# Patient Record
Sex: FEMALE | Race: WHITE | Employment: FULL TIME | URBAN - METROPOLITAN AREA
[De-identification: names, ages, dates, MRNs, and addresses within clinical notes are randomized per-mention and may not be internally consistent; named-entity substitution may affect disease eponyms.]

---

## 2021-11-09 ENCOUNTER — APPOINTMENT (OUTPATIENT)
Dept: CT IMAGING | Age: 36
End: 2021-11-09
Attending: STUDENT IN AN ORGANIZED HEALTH CARE EDUCATION/TRAINING PROGRAM
Payer: COMMERCIAL

## 2021-11-09 ENCOUNTER — HOSPITAL ENCOUNTER (OUTPATIENT)
Age: 36
Setting detail: OBSERVATION
Discharge: HOME | End: 2021-11-10
Attending: STUDENT IN AN ORGANIZED HEALTH CARE EDUCATION/TRAINING PROGRAM | Admitting: FAMILY MEDICINE
Payer: COMMERCIAL

## 2021-11-09 DIAGNOSIS — L03.221 CELLULITIS OF NECK: Primary | ICD-10-CM

## 2021-11-09 LAB
ALBUMIN SERPL-MCNC: 4.1 G/DL (ref 3.5–5)
ALBUMIN/GLOB SERPL: 1 {RATIO} (ref 1.1–2.2)
ALP SERPL-CCNC: 74 U/L (ref 45–117)
ALT SERPL-CCNC: 59 U/L (ref 12–78)
ANION GAP SERPL CALC-SCNC: 5 MMOL/L (ref 5–15)
AST SERPL-CCNC: 45 U/L (ref 15–37)
BASOPHILS # BLD: 0.1 K/UL (ref 0–0.1)
BASOPHILS NFR BLD: 1 % (ref 0–1)
BILIRUB SERPL-MCNC: 0.4 MG/DL (ref 0.2–1)
BUN SERPL-MCNC: 14 MG/DL (ref 6–20)
BUN/CREAT SERPL: 14 (ref 12–20)
CALCIUM SERPL-MCNC: 9 MG/DL (ref 8.5–10.1)
CHLORIDE SERPL-SCNC: 106 MMOL/L (ref 97–108)
CO2 SERPL-SCNC: 26 MMOL/L (ref 21–32)
CREAT SERPL-MCNC: 0.98 MG/DL (ref 0.55–1.02)
DIFFERENTIAL METHOD BLD: NORMAL
EOSINOPHIL # BLD: 0.1 K/UL (ref 0–0.4)
EOSINOPHIL NFR BLD: 2 % (ref 0–7)
ERYTHROCYTE [DISTWIDTH] IN BLOOD BY AUTOMATED COUNT: 12.8 % (ref 11.5–14.5)
GLOBULIN SER CALC-MCNC: 4.2 G/DL (ref 2–4)
GLUCOSE SERPL-MCNC: 82 MG/DL (ref 65–100)
HCG UR QL: NEGATIVE
HCT VFR BLD AUTO: 43.4 % (ref 35–47)
HGB BLD-MCNC: 15 G/DL (ref 11.5–16)
IMM GRANULOCYTES # BLD AUTO: 0 K/UL (ref 0–0.04)
IMM GRANULOCYTES NFR BLD AUTO: 0 % (ref 0–0.5)
LACTATE SERPL-SCNC: 0.8 MMOL/L (ref 0.4–2)
LYMPHOCYTES # BLD: 1.6 K/UL (ref 0.8–3.5)
LYMPHOCYTES NFR BLD: 23 % (ref 12–49)
MCH RBC QN AUTO: 33.9 PG (ref 26–34)
MCHC RBC AUTO-ENTMCNC: 34.6 G/DL (ref 30–36.5)
MCV RBC AUTO: 98.2 FL (ref 80–99)
MONOCYTES # BLD: 0.9 K/UL (ref 0–1)
MONOCYTES NFR BLD: 12 % (ref 5–13)
NEUTS SEG # BLD: 4.4 K/UL (ref 1.8–8)
NEUTS SEG NFR BLD: 62 % (ref 32–75)
NRBC # BLD: 0 K/UL (ref 0–0.01)
NRBC BLD-RTO: 0 PER 100 WBC
PLATELET # BLD AUTO: 312 K/UL (ref 150–400)
PMV BLD AUTO: 10 FL (ref 8.9–12.9)
POTASSIUM SERPL-SCNC: 3.6 MMOL/L (ref 3.5–5.1)
PROT SERPL-MCNC: 8.3 G/DL (ref 6.4–8.2)
RBC # BLD AUTO: 4.42 M/UL (ref 3.8–5.2)
SODIUM SERPL-SCNC: 137 MMOL/L (ref 136–145)
WBC # BLD AUTO: 7.1 K/UL (ref 3.6–11)

## 2021-11-09 PROCEDURE — 94760 N-INVAS EAR/PLS OXIMETRY 1: CPT

## 2021-11-09 PROCEDURE — 74011250637 HC RX REV CODE- 250/637: Performed by: STUDENT IN AN ORGANIZED HEALTH CARE EDUCATION/TRAINING PROGRAM

## 2021-11-09 PROCEDURE — 74011000250 HC RX REV CODE- 250: Performed by: FAMILY MEDICINE

## 2021-11-09 PROCEDURE — 74011250637 HC RX REV CODE- 250/637: Performed by: INTERNAL MEDICINE

## 2021-11-09 PROCEDURE — 65270000032 HC RM SEMIPRIVATE

## 2021-11-09 PROCEDURE — 36415 COLL VENOUS BLD VENIPUNCTURE: CPT

## 2021-11-09 PROCEDURE — 81025 URINE PREGNANCY TEST: CPT

## 2021-11-09 PROCEDURE — 99283 EMERGENCY DEPT VISIT LOW MDM: CPT

## 2021-11-09 PROCEDURE — 87040 BLOOD CULTURE FOR BACTERIA: CPT

## 2021-11-09 PROCEDURE — 70491 CT SOFT TISSUE NECK W/DYE: CPT

## 2021-11-09 PROCEDURE — 74011250636 HC RX REV CODE- 250/636: Performed by: FAMILY MEDICINE

## 2021-11-09 PROCEDURE — 96374 THER/PROPH/DIAG INJ IV PUSH: CPT

## 2021-11-09 PROCEDURE — G0378 HOSPITAL OBSERVATION PER HR: HCPCS

## 2021-11-09 PROCEDURE — 83605 ASSAY OF LACTIC ACID: CPT

## 2021-11-09 PROCEDURE — 80053 COMPREHEN METABOLIC PANEL: CPT

## 2021-11-09 PROCEDURE — 96375 TX/PRO/DX INJ NEW DRUG ADDON: CPT

## 2021-11-09 PROCEDURE — 74011000250 HC RX REV CODE- 250: Performed by: STUDENT IN AN ORGANIZED HEALTH CARE EDUCATION/TRAINING PROGRAM

## 2021-11-09 PROCEDURE — 74011250636 HC RX REV CODE- 250/636: Performed by: STUDENT IN AN ORGANIZED HEALTH CARE EDUCATION/TRAINING PROGRAM

## 2021-11-09 PROCEDURE — 74011000636 HC RX REV CODE- 636: Performed by: RADIOLOGY

## 2021-11-09 PROCEDURE — 85025 COMPLETE CBC W/AUTO DIFF WBC: CPT

## 2021-11-09 PROCEDURE — 74011250637 HC RX REV CODE- 250/637: Performed by: FAMILY MEDICINE

## 2021-11-09 RX ORDER — HEPARIN SODIUM 5000 [USP'U]/ML
5000 INJECTION, SOLUTION INTRAVENOUS; SUBCUTANEOUS EVERY 8 HOURS
Status: DISCONTINUED | OUTPATIENT
Start: 2021-11-09 | End: 2021-11-10 | Stop reason: HOSPADM

## 2021-11-09 RX ORDER — ACETAMINOPHEN 325 MG/1
650 TABLET ORAL
Status: DISCONTINUED | OUTPATIENT
Start: 2021-11-09 | End: 2021-11-10 | Stop reason: HOSPADM

## 2021-11-09 RX ORDER — SODIUM CHLORIDE 0.9 % (FLUSH) 0.9 %
5-40 SYRINGE (ML) INJECTION AS NEEDED
Status: DISCONTINUED | OUTPATIENT
Start: 2021-11-09 | End: 2021-11-10 | Stop reason: HOSPADM

## 2021-11-09 RX ORDER — SODIUM CHLORIDE 0.9 % (FLUSH) 0.9 %
5-40 SYRINGE (ML) INJECTION EVERY 8 HOURS
Status: DISCONTINUED | OUTPATIENT
Start: 2021-11-09 | End: 2021-11-10 | Stop reason: HOSPADM

## 2021-11-09 RX ORDER — VANCOMYCIN/0.9 % SOD CHLORIDE 1.5G/250ML
1500 PLASTIC BAG, INJECTION (ML) INTRAVENOUS ONCE
Status: COMPLETED | OUTPATIENT
Start: 2021-11-09 | End: 2021-11-09

## 2021-11-09 RX ORDER — HYDROMORPHONE HYDROCHLORIDE 4 MG/1
4 TABLET ORAL
Status: DISCONTINUED | OUTPATIENT
Start: 2021-11-09 | End: 2021-11-10 | Stop reason: HOSPADM

## 2021-11-09 RX ORDER — OXYCODONE AND ACETAMINOPHEN 5; 325 MG/1; MG/1
1 TABLET ORAL
Status: COMPLETED | OUTPATIENT
Start: 2021-11-09 | End: 2021-11-09

## 2021-11-09 RX ORDER — SODIUM CHLORIDE 9 MG/ML
75 INJECTION, SOLUTION INTRAVENOUS CONTINUOUS
Status: DISCONTINUED | OUTPATIENT
Start: 2021-11-09 | End: 2021-11-10 | Stop reason: HOSPADM

## 2021-11-09 RX ADMIN — IOPAMIDOL 100 ML: 612 INJECTION, SOLUTION INTRAVENOUS at 16:08

## 2021-11-09 RX ADMIN — ACETAMINOPHEN 650 MG: 325 TABLET ORAL at 22:01

## 2021-11-09 RX ADMIN — CEFEPIME 2 G: 2 INJECTION, POWDER, FOR SOLUTION INTRAVENOUS at 19:29

## 2021-11-09 RX ADMIN — VANCOMYCIN HYDROCHLORIDE 1500 MG: 10 INJECTION, POWDER, LYOPHILIZED, FOR SOLUTION INTRAVENOUS at 17:39

## 2021-11-09 RX ADMIN — Medication 10 ML: at 22:02

## 2021-11-09 RX ADMIN — OXYCODONE AND ACETAMINOPHEN 1 TABLET: 5; 325 TABLET ORAL at 16:24

## 2021-11-09 RX ADMIN — CEFTRIAXONE SODIUM 1 G: 1 INJECTION, POWDER, FOR SOLUTION INTRAMUSCULAR; INTRAVENOUS at 16:24

## 2021-11-09 RX ADMIN — HYDROMORPHONE HYDROCHLORIDE 4 MG: 4 TABLET ORAL at 19:56

## 2021-11-09 NOTE — H&P
History & Physical    Primary Care Provider: None  Source of Information: Patient     History of Presenting Illness:   Elysia Donahue is a 31 y.o. female who presents with neck pain    Patient reports that she was bit by a bee about a week ago on the lateral side of her right neck. Patient reports that she removed the string to 3 days ago, yesterday noticed that the area was swelling up and there was pain and discomfort, reports that she felt her node swelling up, and this morning the redness had increased, she got concerned and decided to come to the hospital.  Patient reports that she felt chills but denies any fevers, patient came to the ER and was requested to be admitted to the hospitalist service. The patient denies any Headache, blurry vision, sore throat, trouble swallowing, trouble with speech, chest pain, SOB, cough, fever, chills, N/V/D, abd pain, urinary symptoms, constipation, recent travels, sick contacts, focal or generalized neurological symptoms, contact with COVID-19 diagnosed patients, hematemesis, melena, hemoptysis, hematuria, rashes, denies starting any new medications and denies any other concerns or problems besides as mentioned above. Review of Systems:  A comprehensive review of systems was negative except for that written in the History of Present Illness. All other systems reviewed, pertinent positives and negatives noted in HPI    Past Medical History:   Diagnosis Date    Abnormal Pap smear     follow up normal    HX OTHER MEDICAL     celiac disease    Psychiatric problem     A.D. D. no longer takes madication    Trauma     fx tail bone       Past Surgical History:   Procedure Laterality Date    HX  SECTION      HX GYN       Prior to Admission medications    Medication Sig Start Date End Date Taking? Authorizing Provider   ibuprofen (MOTRIN) 800 mg tablet Take 1 Tab by mouth every eight (8) hours as needed for Pain. 2/25/15   Morgan Fonseca MD   oxyCODONE-acetaminophen (PERCOCET 7.5) 7.5-325 mg per tablet Take 1-2 Tabs by mouth every six (6) hours as needed for Pain. Max Daily Amount: 8 Tabs. 2/25/15   Morgan Fonseca MD   PRENATAL VIT W-CA,FE,FA,<1 MG, (PRENATAL VITAMIN PO) Take 1 Tab by mouth daily. Indications: PREGNANCY    Lupe Esquivel MD     Allergies   Allergen Reactions    Wheat Nausea and Vomiting     Celiac disease      Family History   Problem Relation Age of Onset    Heart Disease Father       Family history was discussed with the patient, all pertinent and relevant details are mentioned as above, no other pertinent and relevant family history was noted on my discussion with the patient.   Patient specifically denies any history of Gaucher disease in the family  SOCIAL HISTORY:  Patient resides:  Independently x   Assisted Living    SNF    With family care       Smoking history:   None    Former x   Chronic      Alcohol history:   None    Social x   Chronic      Ambulates:   Independently x   w/cane    w/walker    w/wc    CODE STATUS:  DNR    Full x   Other      Objective:     Physical Exam:     Visit Vitals  BP (!) 142/87   Pulse 73   Temp 98.9 °F (37.2 °C)   Resp 16   Wt 54.4 kg (120 lb)   SpO2 100%   BMI 20.60 kg/m²      O2 Device: None (Room air)    General : alert x 3, awake, no acute distress, resting in bed, pleasant /female, appears to be stated age  [de-identified]: PEERL, EOMI, moist mucus membrane, TM clear  Neck: supple, large area of erythema right lateral neck, cervical lymphadenopathy  Chest: Clear to auscultation bilaterally   CVS: S1 S2 heard, Capillary refill less than 2 seconds  Abd: soft/ Non tender, non distended, BS physiological,   Ext: no clubbing, no cyanosis, no edema, brisk 2+ DP pulses  Neuro/Psych: pleasant mood and affect, CN 2-12 grossly intact, sensory grossly within normal limit, Strength 5/5 in all extremities, DTR 1+ x 4  Skin: warm           Data Review: Recent Days:  Recent Labs     11/09/21  1431   WBC 7.1   HGB 15.0   HCT 43.4        Recent Labs     11/09/21  1431      K 3.6      CO2 26   GLU 82   BUN 14   CREA 0.98   CA 9.0   ALB 4.1   ALT 59     No results for input(s): PH, PCO2, PO2, HCO3, FIO2 in the last 72 hours. 24 Hour Results:  Recent Results (from the past 24 hour(s))   CBC WITH AUTOMATED DIFF    Collection Time: 11/09/21  2:31 PM   Result Value Ref Range    WBC 7.1 3.6 - 11.0 K/uL    RBC 4.42 3.80 - 5.20 M/uL    HGB 15.0 11.5 - 16.0 g/dL    HCT 43.4 35.0 - 47.0 %    MCV 98.2 80.0 - 99.0 FL    MCH 33.9 26.0 - 34.0 PG    MCHC 34.6 30.0 - 36.5 g/dL    RDW 12.8 11.5 - 14.5 %    PLATELET 503 522 - 150 K/uL    MPV 10.0 8.9 - 12.9 FL    NRBC 0.0 0  WBC    ABSOLUTE NRBC 0.00 0.00 - 0.01 K/uL    NEUTROPHILS 62 32 - 75 %    LYMPHOCYTES 23 12 - 49 %    MONOCYTES 12 5 - 13 %    EOSINOPHILS 2 0 - 7 %    BASOPHILS 1 0 - 1 %    IMMATURE GRANULOCYTES 0 0.0 - 0.5 %    ABS. NEUTROPHILS 4.4 1.8 - 8.0 K/UL    ABS. LYMPHOCYTES 1.6 0.8 - 3.5 K/UL    ABS. MONOCYTES 0.9 0.0 - 1.0 K/UL    ABS. EOSINOPHILS 0.1 0.0 - 0.4 K/UL    ABS. BASOPHILS 0.1 0.0 - 0.1 K/UL    ABS. IMM. GRANS. 0.0 0.00 - 0.04 K/UL    DF AUTOMATED     METABOLIC PANEL, COMPREHENSIVE    Collection Time: 11/09/21  2:31 PM   Result Value Ref Range    Sodium 137 136 - 145 mmol/L    Potassium 3.6 3.5 - 5.1 mmol/L    Chloride 106 97 - 108 mmol/L    CO2 26 21 - 32 mmol/L    Anion gap 5 5 - 15 mmol/L    Glucose 82 65 - 100 mg/dL    BUN 14 6 - 20 MG/DL    Creatinine 0.98 0.55 - 1.02 MG/DL    BUN/Creatinine ratio 14 12 - 20      GFR est AA >60 >60 ml/min/1.73m2    GFR est non-AA >60 >60 ml/min/1.73m2    Calcium 9.0 8.5 - 10.1 MG/DL    Bilirubin, total 0.4 0.2 - 1.0 MG/DL    ALT (SGPT) 59 12 - 78 U/L    AST (SGOT) 45 (H) 15 - 37 U/L    Alk.  phosphatase 74 45 - 117 U/L    Protein, total 8.3 (H) 6.4 - 8.2 g/dL    Albumin 4.1 3.5 - 5.0 g/dL    Globulin 4.2 (H) 2.0 - 4.0 g/dL    A-G Ratio 1.0 (L) 1.1 - 2.2     LACTIC ACID    Collection Time: 11/09/21  2:31 PM   Result Value Ref Range    Lactic acid 0.8 0.4 - 2.0 MMOL/L   HCG URINE, QL. - POC    Collection Time: 11/09/21  2:31 PM   Result Value Ref Range    Pregnancy test,urine (POC) Negative NEG           Imaging:     CT NECK SOFT TISSUE W CONT    Result Date: 11/9/2021  In the area of palpable abnormality in the right neck, there is skin thickening and mild inflammatory changes in the subcutaneous soft tissues without evidence of a drainable abscess and findings may represent cellulitis. .      Assessment/Plan     Cellulitis of the neck    Patient will be admitted to the hospital, start patient on broad-spectrum IV antibiotics, IV fluids, blood cultures, pain control, may consider getting ID consult, supportive care and further intervention per hospitalist, reassess as needed      GI DVT prophylaxis: Patient will be on heparin            Please note that this dictation was completed with [email or web address], the computer voice recognition software. Quite often unanticipated grammatical, syntax, homophones, and other interpretive errors are inadvertently transcribed by the computer software. Please disregard these errors. Please excuse any errors that have escaped final proofreading.          Signed By: Daria Wan MD     November 9, 2021

## 2021-11-09 NOTE — ED PROVIDER NOTES
Patient is a 45-year-old female prior history of celiac disease and ADD presenting today secondary to a skin infection. Patient reports that she was stung by a bee about a week ago to the right lateral neck. She remove the stinger about 3 days ago and then yesterday noticed that she was having swelling and pain to the right lateral neck. She reports that she has hard, tender and red lymph nodes. She is not had fever. No drainage noticed. She went to urgent care where they sent her here for further management. Past Medical History:   Diagnosis Date    Abnormal Pap smear     follow up normal    HX OTHER MEDICAL     celiac disease    Psychiatric problem     A.D. D. no longer takes madication    Trauma     fx tail bone        Past Surgical History:   Procedure Laterality Date    HX  SECTION  2014    HX GYN           Family History:   Problem Relation Age of Onset    Heart Disease Father        Social History     Socioeconomic History    Marital status: SINGLE     Spouse name: Not on file    Number of children: Not on file    Years of education: Not on file    Highest education level: Not on file   Occupational History    Not on file   Tobacco Use    Smoking status: Former Smoker     Quit date: 10/21/2014     Years since quittin.0    Smokeless tobacco: Never Used   Substance and Sexual Activity    Alcohol use: Yes     Comment: socially     Drug use: No    Sexual activity: Yes     Partners: Male     Birth control/protection: Pill   Other Topics Concern    Not on file   Social History Narrative    Not on file     Social Determinants of Health     Financial Resource Strain:     Difficulty of Paying Living Expenses: Not on file   Food Insecurity:     Worried About [address] in the Last Year: Not on file    Saida of Food in the Last Year: Not on file   Transportation Needs:     Lack of Transportation (Medical):  Not on file    Lack of Transportation (Non-Medical): Not on file   Physical Activity:     Days of Exercise per Week: Not on file    Minutes of Exercise per Session: Not on file   Stress:     Feeling of Stress : Not on file   Social Connections:     Frequency of Communication with Friends and Family: Not on file    Frequency of Social Gatherings with Friends and Family: Not on file    Attends Jehovah's witness Services: Not on file    Active Member of [address] [place] [address] or Organizations: Not on file    Attends Club or Organization Meetings: Not on file    Marital Status: Not on file   Intimate Partner Violence:     Fear of Current or Ex-Partner: Not on file    Emotionally Abused: Not on file    Physically Abused: Not on file    Sexually Abused: Not on file   Housing Stability:     Unable to Pay for Housing in the Last Year: Not on file    Number of [place] in the Last Year: Not on file    Unstable Housing in the Last Year: Not on file         ALLERGIES: Wheat    Review of Systems   Constitutional: Negative for chills and fever. HENT: Negative for congestion and rhinorrhea. Eyes: Negative for redness and visual disturbance. Respiratory: Negative for cough and shortness of breath. Cardiovascular: Negative for chest pain and leg swelling. Gastrointestinal: Negative for abdominal pain, diarrhea, nausea and vomiting. Genitourinary: Negative for dysuria, flank pain, frequency, hematuria and urgency. Musculoskeletal: Negative for arthralgias, back pain, myalgias and neck pain. Skin: Positive for wound. Negative for rash. Allergic/Immunologic: Negative for immunocompromised state. Neurological: Negative for dizziness and headaches. Hematological: Positive for adenopathy. Vitals:    11/09/21 1408   BP: 136/71   Pulse: 98   Resp: 18   Temp: 97.8 °F (36.6 °C)   SpO2: 99%            Physical Exam  Vitals and nursing note reviewed. Constitutional:       General: She is not in acute distress. Appearance: She is well-developed.  She is not diaphoretic. HENT:      Head: Normocephalic. Mouth/Throat:      Pharynx: No oropharyngeal exudate. Eyes:      General:         Right eye: No discharge. Left eye: No discharge. Pupils: Pupils are equal, round, and reactive to light. Neck:      Comments: There is a 1.5 cm area of induration with centralized ulceration/crusting and surrounding erythema with 2 areas of lymphangitic streaking up to the ear. There is erythema with tenderness and swelling of the retroauricular lymph nodes. Cardiovascular:      Rate and Rhythm: Normal rate and regular rhythm. Heart sounds: Normal heart sounds. No murmur heard. No friction rub. No gallop. Pulmonary:      Effort: Pulmonary effort is normal. No respiratory distress. Breath sounds: Normal breath sounds. No stridor. No wheezing or rales. Abdominal:      General: Bowel sounds are normal. There is no distension. Palpations: Abdomen is soft. Tenderness: There is no abdominal tenderness. There is no guarding or rebound. Musculoskeletal:         General: No deformity. Normal range of motion. Cervical back: Normal range of motion and neck supple. Skin:     General: Skin is warm and dry. Capillary Refill: Capillary refill takes less than 2 seconds. Neurological:      Mental Status: She is alert and oriented to person, place, and time. Psychiatric:         Behavior: Behavior normal.          MDM       Procedures  IV antibiotics initiated: Vancomycin/ceftriaxone    No leukocytosis or anemia  Renal function electrolytes acceptable  Lactic acid normal    CT does not show a drainable abscess           Perfect Serve Consult for Admission  5:05 PM    ED Room Number: R37/R37  Patient Name and age:  Luis Cintron 31 y.o.  female  Working Diagnosis:   1.  Cellulitis of neck        COVID-19 Suspicion:  no  Sepsis present:  no  Reassessment needed: no  Code Status:  Full Code  Readmission: no  Isolation Requirements: no  Recommended Level of Care:  med/surg  Department:[facility] Adult ED - [phone]  Other:  31 y.o. female here with R neck cellulitis and lymphangitic streaking up to the ear. No drainable abscess. The patient is an otherwise well-appearing 72-year-old female presenting today with cellulitis of the right neck, without drainable abscess, and lymphangitic streaking. Given its location near the vasculature in her neck with lymphangitic streaking I feel it is best that she gets IV antibiotics and admitted. She does not appear septic at this time. ICD-10-CM ICD-9-CM    1. Cellulitis of neck  L03.221 682. 91 Cole Palomo DO

## 2021-11-09 NOTE — ED TRIAGE NOTES
TRIAGE NOTE:  Patient arrives ambulatory [facility] for further evaluation of cellulitis. Patient reports on Saturday noticed a bee stinger in neck for a week and states pulled it out. Redness and swelling noted to right side of neck.

## 2021-11-09 NOTE — ROUTINE PROCESS
TRANSFER - OUT REPORT:    Verbal report given to Frances(name) on Claudia Luque  being transferred to (unit) for routine progression of care       Report consisted of patients Situation, Background, Assessment and   Recommendations(SBAR). Information from the following report(s) SBAR was reviewed with the receiving nurse. Lines:   Peripheral IV 11/09/21 Left Antecubital (Active)   Site Assessment Clean, dry, & intact 11/09/21 1436   Phlebitis Assessment 0 11/09/21 1436   Infiltration Assessment 0 11/09/21 1436   Dressing Status Clean, dry, & intact 11/09/21 1436   Dressing Type Transparent 11/09/21 1436   Hub Color/Line Status Pink; Flushed; Patent 11/09/21 1436   Action Taken Blood drawn 11/09/21 1436        Opportunity for questions and clarification was provided.       Patient transported with:   [employer]

## 2021-11-10 VITALS
WEIGHT: 123.46 LBS | OXYGEN SATURATION: 99 % | TEMPERATURE: 98 F | BODY MASS INDEX: 21.08 KG/M2 | HEIGHT: 64 IN | SYSTOLIC BLOOD PRESSURE: 135 MMHG | DIASTOLIC BLOOD PRESSURE: 89 MMHG | RESPIRATION RATE: 18 BRPM | HEART RATE: 80 BPM

## 2021-11-10 LAB
ANION GAP SERPL CALC-SCNC: 6 MMOL/L (ref 5–15)
BASOPHILS # BLD: 0.1 K/UL (ref 0–0.1)
BASOPHILS NFR BLD: 1 % (ref 0–1)
BUN SERPL-MCNC: 18 MG/DL (ref 6–20)
BUN/CREAT SERPL: 26 (ref 12–20)
CALCIUM SERPL-MCNC: 8.8 MG/DL (ref 8.5–10.1)
CHLORIDE SERPL-SCNC: 109 MMOL/L (ref 97–108)
CO2 SERPL-SCNC: 22 MMOL/L (ref 21–32)
CREAT SERPL-MCNC: 0.68 MG/DL (ref 0.55–1.02)
DIFFERENTIAL METHOD BLD: ABNORMAL
EOSINOPHIL # BLD: 0.3 K/UL (ref 0–0.4)
EOSINOPHIL NFR BLD: 5 % (ref 0–7)
ERYTHROCYTE [DISTWIDTH] IN BLOOD BY AUTOMATED COUNT: 12.7 % (ref 11.5–14.5)
GLUCOSE SERPL-MCNC: 92 MG/DL (ref 65–100)
HCT VFR BLD AUTO: 42.7 % (ref 35–47)
HGB BLD-MCNC: 14.9 G/DL (ref 11.5–16)
IMM GRANULOCYTES # BLD AUTO: 0 K/UL (ref 0–0.04)
IMM GRANULOCYTES NFR BLD AUTO: 0 % (ref 0–0.5)
LYMPHOCYTES # BLD: 2 K/UL (ref 0.8–3.5)
LYMPHOCYTES NFR BLD: 35 % (ref 12–49)
MCH RBC QN AUTO: 33.9 PG (ref 26–34)
MCHC RBC AUTO-ENTMCNC: 34.9 G/DL (ref 30–36.5)
MCV RBC AUTO: 97.3 FL (ref 80–99)
MONOCYTES # BLD: 0.8 K/UL (ref 0–1)
MONOCYTES NFR BLD: 15 % (ref 5–13)
NEUTS SEG # BLD: 2.4 K/UL (ref 1.8–8)
NEUTS SEG NFR BLD: 44 % (ref 32–75)
NRBC # BLD: 0 K/UL (ref 0–0.01)
NRBC BLD-RTO: 0 PER 100 WBC
PLATELET # BLD AUTO: 305 K/UL (ref 150–400)
PMV BLD AUTO: 10 FL (ref 8.9–12.9)
POTASSIUM SERPL-SCNC: 4.2 MMOL/L (ref 3.5–5.1)
RBC # BLD AUTO: 4.39 M/UL (ref 3.8–5.2)
SODIUM SERPL-SCNC: 137 MMOL/L (ref 136–145)
WBC # BLD AUTO: 5.6 K/UL (ref 3.6–11)

## 2021-11-10 PROCEDURE — 74011636637 HC RX REV CODE- 636/637: Performed by: INTERNAL MEDICINE

## 2021-11-10 PROCEDURE — 74011250637 HC RX REV CODE- 250/637: Performed by: INTERNAL MEDICINE

## 2021-11-10 PROCEDURE — 74011000250 HC RX REV CODE- 250: Performed by: FAMILY MEDICINE

## 2021-11-10 PROCEDURE — 96376 TX/PRO/DX INJ SAME DRUG ADON: CPT

## 2021-11-10 PROCEDURE — 80048 BASIC METABOLIC PNL TOTAL CA: CPT

## 2021-11-10 PROCEDURE — 36415 COLL VENOUS BLD VENIPUNCTURE: CPT

## 2021-11-10 PROCEDURE — 74011250636 HC RX REV CODE- 250/636: Performed by: FAMILY MEDICINE

## 2021-11-10 PROCEDURE — 74011000250 HC RX REV CODE- 250: Performed by: INTERNAL MEDICINE

## 2021-11-10 PROCEDURE — G0378 HOSPITAL OBSERVATION PER HR: HCPCS

## 2021-11-10 PROCEDURE — 85025 COMPLETE CBC W/AUTO DIFF WBC: CPT

## 2021-11-10 PROCEDURE — 74011250637 HC RX REV CODE- 250/637: Performed by: FAMILY MEDICINE

## 2021-11-10 RX ORDER — CETIRIZINE HCL 10 MG
10 TABLET ORAL
Qty: 7 TABLET | Refills: 0 | Status: SHIPPED | OUTPATIENT
Start: 2021-11-10 | End: 2021-11-10 | Stop reason: SDUPTHER

## 2021-11-10 RX ORDER — LIDOCAINE 40 MG/G
CREAM TOPICAL
Status: COMPLETED | OUTPATIENT
Start: 2021-11-10 | End: 2021-11-10

## 2021-11-10 RX ORDER — FLUOXETINE 20 MG/1
40 TABLET ORAL DAILY
COMMUNITY

## 2021-11-10 RX ORDER — OXYCODONE AND ACETAMINOPHEN 7.5; 325 MG/1; MG/1
1 TABLET ORAL
Qty: 10 TABLET | Refills: 0 | Status: SHIPPED | OUTPATIENT
Start: 2021-11-10 | End: 2021-11-13

## 2021-11-10 RX ORDER — NICOTINE 7MG/24HR
1 PATCH, TRANSDERMAL 24 HOURS TRANSDERMAL EVERY 24 HOURS
Qty: 30 PATCH | Refills: 0 | Status: SHIPPED | OUTPATIENT
Start: 2021-11-10 | End: 2021-12-10

## 2021-11-10 RX ORDER — FLUOXETINE 20 MG/1
40 TABLET ORAL EVERY MORNING
Status: DISCONTINUED | OUTPATIENT
Start: 2021-11-10 | End: 2021-11-10 | Stop reason: HOSPADM

## 2021-11-10 RX ORDER — CETIRIZINE HCL 10 MG
10 TABLET ORAL
Qty: 7 TABLET | Refills: 0 | Status: SHIPPED | OUTPATIENT
Start: 2021-11-10 | End: 2021-11-17

## 2021-11-10 RX ORDER — NICOTINE 7MG/24HR
1 PATCH, TRANSDERMAL 24 HOURS TRANSDERMAL EVERY 24 HOURS
Qty: 30 PATCH | Refills: 0 | Status: SHIPPED | OUTPATIENT
Start: 2021-11-10 | End: 2021-11-10 | Stop reason: SDUPTHER

## 2021-11-10 RX ORDER — CEPHALEXIN 500 MG/1
500 CAPSULE ORAL 4 TIMES DAILY
Qty: 20 CAPSULE | Refills: 0 | Status: SHIPPED | OUTPATIENT
Start: 2021-11-10 | End: 2021-11-10 | Stop reason: SDUPTHER

## 2021-11-10 RX ORDER — DEXTROAMPHETAMINE SACCHARATE, AMPHETAMINE ASPARTATE, DEXTROAMPHETAMINE SULFATE AND AMPHETAMINE SULFATE 2.5; 2.5; 2.5; 2.5 MG/1; MG/1; MG/1; MG/1
20 TABLET ORAL 2 TIMES DAILY
Status: DISCONTINUED | OUTPATIENT
Start: 2021-11-10 | End: 2021-11-10 | Stop reason: HOSPADM

## 2021-11-10 RX ORDER — DEXTROAMPHETAMINE SACCHARATE, AMPHETAMINE ASPARTATE, DEXTROAMPHETAMINE SULFATE AND AMPHETAMINE SULFATE 5; 5; 5; 5 MG/1; MG/1; MG/1; MG/1
20 TABLET ORAL 2 TIMES DAILY
COMMUNITY
Start: 2021-10-14

## 2021-11-10 RX ORDER — PREDNISONE 20 MG/1
40 TABLET ORAL ONCE
Status: COMPLETED | OUTPATIENT
Start: 2021-11-10 | End: 2021-11-10

## 2021-11-10 RX ORDER — IBUPROFEN 800 MG/1
800 TABLET ORAL
Qty: 12 TABLET | Refills: 0 | Status: SHIPPED | OUTPATIENT
Start: 2021-11-10

## 2021-11-10 RX ORDER — CLOBETASOL PROPIONATE 0.5 MG/G
OINTMENT TOPICAL 3 TIMES DAILY
Qty: 15 G | Refills: 0 | Status: SHIPPED | OUTPATIENT
Start: 2021-11-10 | End: 2021-11-13

## 2021-11-10 RX ORDER — CEPHALEXIN 500 MG/1
500 CAPSULE ORAL 4 TIMES DAILY
Qty: 20 CAPSULE | Refills: 0 | Status: SHIPPED | OUTPATIENT
Start: 2021-11-10 | End: 2021-11-15

## 2021-11-10 RX ORDER — CLOBETASOL PROPIONATE 0.5 MG/G
OINTMENT TOPICAL 3 TIMES DAILY
Qty: 15 G | Refills: 0 | Status: SHIPPED | OUTPATIENT
Start: 2021-11-10 | End: 2021-11-10 | Stop reason: SDUPTHER

## 2021-11-10 RX ORDER — PREDNISONE 10 MG/1
TABLET ORAL
Qty: 6 TABLET | Refills: 0 | Status: SHIPPED | OUTPATIENT
Start: 2021-11-11 | End: 2021-11-10

## 2021-11-10 RX ORDER — FLUOXETINE HYDROCHLORIDE 20 MG/1
40 CAPSULE ORAL DAILY
Status: DISCONTINUED | OUTPATIENT
Start: 2021-11-10 | End: 2021-11-10 | Stop reason: SDUPTHER

## 2021-11-10 RX ORDER — PREDNISONE 10 MG/1
TABLET ORAL
Qty: 6 TABLET | Refills: 0 | Status: SHIPPED | OUTPATIENT
Start: 2021-11-11 | End: 2021-11-14

## 2021-11-10 RX ORDER — IBUPROFEN 200 MG
1 TABLET ORAL DAILY
Status: DISCONTINUED | OUTPATIENT
Start: 2021-11-10 | End: 2021-11-10 | Stop reason: HOSPADM

## 2021-11-10 RX ADMIN — ACETAMINOPHEN 650 MG: 325 TABLET ORAL at 03:10

## 2021-11-10 RX ADMIN — HYDROMORPHONE HYDROCHLORIDE 4 MG: 4 TABLET ORAL at 13:42

## 2021-11-10 RX ADMIN — DEXTROAMPHETAMINE SACCHARATE, AMPHETAMINE ASPARTATE, DEXTROAMPHETAMINE SULFATE, AMPHETAMINE SULFATE TABLETS, 10 MG,CLL 20 MG: 2.5; 2.5; 2.5; 2.5 TABLET ORAL at 12:06

## 2021-11-10 RX ADMIN — PREDNISONE 40 MG: 20 TABLET ORAL at 13:42

## 2021-11-10 RX ADMIN — HYDROMORPHONE HYDROCHLORIDE 4 MG: 4 TABLET ORAL at 09:24

## 2021-11-10 RX ADMIN — ACETAMINOPHEN 650 MG: 325 TABLET ORAL at 07:03

## 2021-11-10 RX ADMIN — SODIUM CHLORIDE 75 ML/HR: 9 INJECTION, SOLUTION INTRAVENOUS at 00:07

## 2021-11-10 RX ADMIN — CEFEPIME 2 G: 2 INJECTION, POWDER, FOR SOLUTION INTRAVENOUS at 07:03

## 2021-11-10 RX ADMIN — FLUOXETINE 40 MG: 20 TABLET ORAL at 12:32

## 2021-11-10 RX ADMIN — ACETAMINOPHEN 650 MG: 325 TABLET ORAL at 11:01

## 2021-11-10 RX ADMIN — LIDOCAINE 4%: 4 CREAM TOPICAL at 12:33

## 2021-11-10 RX ADMIN — Medication 10 ML: at 13:42

## 2021-11-10 RX ADMIN — HYDROMORPHONE HYDROCHLORIDE 4 MG: 4 TABLET ORAL at 04:57

## 2021-11-10 RX ADMIN — VANCOMYCIN HYDROCHLORIDE 750 MG: 750 INJECTION, POWDER, LYOPHILIZED, FOR SOLUTION INTRAVENOUS at 04:58

## 2021-11-10 RX ADMIN — HYDROMORPHONE HYDROCHLORIDE 4 MG: 4 TABLET ORAL at 00:07

## 2021-11-10 NOTE — DISCHARGE SUMMARY
Discharge Summary     PATIENT ID: Morgan Aguilar  MRN: [id number]   YOB: 1985    DATE OF ADMISSION: 11/9/2021  3:31 PM    DATE OF DISCHARGE: 11/10/2021  PRIMARY CARE PROVIDER: None   DISCHARGING PROVIDER: Vonda Garcia MD    To contact this individual call [phone] and ask the  to page. If unavailable ask to be transferred the Adult Hospitalist Department. CONSULTATIONS: None    PROCEDURES/SURGERIES: * No surgery found *    ADMITTING [address] [place] [address] COURSE:   Morgan Aguilar is a 31 y.o. female who presents with neck pain   Patient reports that she was bit by a bee about a week ago on the lateral side of her right neck. Patient reports that she removed the string to 3 days ago, yesterday noticed that the area was swelling up and there was pain and discomfort, reports that she felt her node swelling up, and this morning the redness had increased, she got concerned and decided to come to the hospital.  Patient reports that she felt chills but denies any fevers, patient came to the ER and was requested to be admitted to the hospitalist service.     DISCHARGE DIAGNOSES / PLAN:      Large local reaction from bee sting after removing bee stinger \"incorrectly\" therefore releasing more venom  AFVSS, CBC wnl, no SIRS or sepsis criteria  BC NGTD  Symptom control with short prednisone course; antihistamines, nsaids, topical steroid  CT neck shows inflammation, no drainable abscess  Cont empiric PO abx    ADD  Cont home adderall    Tobacco abuse  Counseled  Patch    Anxiety/depression  Cont home prozac        ADDITIONAL CARE RECOMMENDATIONS: f/u PCP    PENDING TEST RESULTS:   At the time of discharge the following test results are still pending: none    Patient Instructions     FOLLOW UP APPOINTMENTS:    Follow-up Information     Follow up With Specialties Details Why Contact Info    None    None (395) Patient stated that they have no PCP           DIET: Regular Diet    ACTIVITY: Activity as tolerated    NOTIFY YOUR PHYSICIAN FOR ANY OF THE FOLLOWING:   Fever over 101 degrees for 24 hours. Chest pain, shortness of breath, fever, chills, nausea, vomiting, diarrhea, change in mentation, falling, weakness, bleeding. Severe pain or pain not relieved by medications. Or, any other signs or symptoms that you may have questions about. DISPOSITION:  x  Home With:   OT  PT  HH  RN       Long term SNF/Inpatient Rehab    Independent/assisted living    Hospice    Other:       PATIENT CONDITION AT DISCHARGE:   Functional status    Poor     Deconditioned    x Independent      Cognition    x Lucid     Forgetful     Dementia      Catheters/lines (plus indication)    Wright     PICC     PEG    x None      Code status   x  Full code     DNR      PHYSICAL EXAMINATION AT DISCHARGE:  General:  Alert, cooperative, no distress  HEENT: Right neck with large local reaction around bee sting site with tender LAD  Lungs:   Clear to auscultation bilaterally, symmetric expansion, no respiratory distress  Chest wall:  No tenderness or deformity  Heart:   Regular rate and rhythm  Abdomen:   Soft, non-tender  Extremities: Extremities normal, atraumatic, no cyanosis or edema  Skin:  Skin color, texture, turgor normal. No rashes or lesions  Neurologic: CNII-XII intact. PALAFOX. A&O    CHRONIC MEDICAL DIAGNOSES:  Problem List as of 11/10/2021 Date Reviewed: 2/22/2015          Codes Class Noted - Resolved    Cellulitis of neck ICD-10-CM: L03.221  ICD-9-CM: 682.1  11/9/2021 - Present        RESOLVED: Pregnancy ICD-10-CM: Z34.90  ICD-9-CM: V22.2  2/21/2015 - 11/10/2021              DISCHARGE MEDICATIONS:  Current Discharge Medication List      START taking these medications    Details   nicotine (NICODERM CQ) 7 mg/24 hr 1 Patch by TransDERmal route every twenty-four (24) hours for 30 days.   Qty: 30 Patch, Refills: 0  Start date: 11/10/2021, End date: 12/10/2021      predniSONE (DELTASONE) 10 mg tablet Take 30 mg by mouth daily for 1 day, THEN 20 mg daily for 1 day, THEN 10 mg daily for 1 day. Start 11/11/21  Qty: 6 Tablet, Refills: 0  Start date: 11/11/2021, End date: 11/14/2021      cephALEXin (Keflex) 500 mg capsule Take 1 Capsule by mouth four (4) times daily for 5 days. Qty: 20 Capsule, Refills: 0  Start date: 11/10/2021, End date: 11/15/2021      cetirizine (ZYRTEC) 10 mg tablet Take 1 Tablet by mouth nightly for 7 days. Qty: 7 Tablet, Refills: 0  Start date: 11/10/2021, End date: 11/17/2021      clobetasoL (TEMOVATE) 0.05 % ointment Apply  to affected area three (3) times daily for 3 days. Apply to itchy area on right neck  Qty: 15 g, Refills: 0  Start date: 11/10/2021, End date: 11/13/2021         CONTINUE these medications which have CHANGED    Details   oxyCODONE-acetaminophen (PERCOCET 7.5) 7.5-325 mg per tablet Take 1 Tablet by mouth every six (6) hours as needed for Pain for up to 3 days. Max Daily Amount: 4 Tablets. Qty: 10 Tablet, Refills: 0  Start date: 11/10/2021, End date: 11/13/2021    Associated Diagnoses: Cellulitis of neck      ibuprofen (MOTRIN) 800 mg tablet Take 1 Tablet by mouth every eight (8) hours as needed for Pain. Qty: 12 Tablet, Refills: 0  Start date: 11/10/2021         CONTINUE these medications which have NOT CHANGED    Details   dextroamphetamine-amphetamine (ADDERALL) 20 mg tablet 20 mg two (2) times a day. FLUoxetine (PROzac) 20 mg tablet Take 40 mg by mouth daily. PRENATAL VIT W-CA,FE,FA,<1 MG, (PRENATAL VITAMIN PO) Take 1 Tab by mouth daily.  Indications: PREGNANCY           Greater than 30 minutes were spent with the patient on counseling and coordination of care    Signed:   Vandana Agrawal MD  11/10/2021  1:08 PM

## 2021-11-10 NOTE — PROGRESS NOTES
TRANSFER - IN REPORT:    Verbal report received from Magalys Bobo RN(name) on Darene Belts  being received from Emergency Department(unit) for routine progression of care      Report consisted of patients Situation, Background, Assessment and   Recommendations(SBAR). Information from the following report(s) SBAR, ED Summary, [facility] and Recent Results was reviewed with the receiving nurse. Opportunity for questions and clarification was provided. Assessment completed upon patients arrival to unit and care assumed.

## 2021-11-10 NOTE — PROGRESS NOTES
Problem: Cellulitis Care Plan (Adult)  Goal: *Control of acute pain  Outcome: Resolved/Met  Goal: *Skin integrity maintained  Outcome: Resolved/Met  Goal: *Absence of infection signs and symptoms  Outcome: Resolved/Met     Problem: Patient Education: Go to Patient Education Activity  Goal: Patient/Family Education  Outcome: Resolved/Met     Problem: Pain  Goal: *Control of Pain  Outcome: Resolved/Met  Goal: *PALLIATIVE CARE:  Alleviation of Pain  Outcome: Resolved/Met     Problem: Patient Education: Go to Patient Education Activity  Goal: Patient/Family Education  Outcome: Resolved/Met     Problem: Falls - Risk of  Goal: *Absence of Falls  Description: Document Gail Fall Risk and appropriate interventions in the flowsheet.   Outcome: Resolved/Met     Problem: Patient Education: Go to Patient Education Activity  Goal: Patient/Family Education  Outcome: Resolved/Met

## 2021-11-10 NOTE — PROGRESS NOTES
Day #2 of Vancomycin  Indication:  SSTI. -cellulitis from bee sting to neck  Current regimen:  750 mg IV Q12H  Abx regimen:  vancomycin + cefepime  ID Following ?: YES- consulted, has not seen pt  Concomitant nephrotoxic drugs (requires more frequent monitoring): Contrast agents   Frequency of BMP?: 11/15    Recent Labs     11/10/21  0017 21  1431   WBC 5.6 7.1   CREA 0.68 0.98   BUN 18 14     Est CrCl:  ml/min; UO: --- ml/kg/hr  Temp (24hrs), Av.4 °F (36.9 °C), Min:97.8 °F (36.6 °C), Max:98.9 °F (37.2 °C)    Cultures:    NGTD    MRSA Swab ordered (if applicable)? N/A    Goal target range AUC/MATTHEW 400-500    Recent level history:  Date/Time Dose & Interval Measured Level (mcg/mL) Associated AUC/MATTHEW Dose Adjustment                                                   Plan: Significant improvement in Scr, anticipated AUC/MATTHEW now subtherapeutic at 385. Change to vancomycin 1000 mg Q12H for anticipated trough of 511.

## 2021-11-10 NOTE — PROGRESS NOTES
I have reviewed discharge instructions with the patient. Questions and concerns addressed. Prescriptions faxed to patient's pharmacy. The patient verbalized understanding. IV access removed. Patient's signature placed on chart for discharge paperwork d/t signature pad not working. Patient declined a wheelchair and was walked out by [facility].

## 2021-11-10 NOTE — DISCHARGE INSTRUCTIONS
To whom it may concern:    Claudia Luque was hospitalized at [facility] in [place] [address] from 11/9/2021 until 11/10/2021. Please excuse her absence. Thank you.     Sincerely,  Arsh Morse MD  [address]. [place]  [phone]

## 2021-11-10 NOTE — PROGRESS NOTES
Pharmacist Note - Vancomycin Dosing    Consult provided for this 31 y.o. female for indication of SSTI. -cellulitis from bee sting to neck  Antibiotic regimen(s): Vanc + Cefepime (adjusted to 2 grams q12h)    Recent Labs     21  1431   WBC 7.1   CREA 0.98   BUN 14     Height: 64 in  Weight: 54.4 kg  Est CrCl: >60 ml/min  Temp (24hrs), Av.4 °F (36.9 °C), Min:97.8 °F (36.6 °C), Max:98.9 °F (37.2 °C)    The plan below is expected to result in a target range of AUC/MATTHEW 400-500    Therapy will be initiated with a loading dose of 1500 mg IV x 1 to be followed by a maintenance dose of 750 mg IV every 12 hours. Pharmacy to follow patient daily and order levels / make dose adjustments as appropriate. *Vancomycin has been dosed used Bayesian kinetics software to target an AUC/MATTHEW of 400-600, which provides adequate exposure for an assumed infection due to MRSA with an MATTHEW of 1 or less while reducing the risk of nephrotoxicity as seen with traditional trough based dosing goals.

## 2021-11-10 NOTE — PROGRESS NOTES
Pt is laying in bed with even and unlabored respirations on room air. Pt was given pain medication prior to shift change. No distress noted. Pt has Vancomycin running through her IV. Pt was given IV rocephin as well. Continuing antibiotics and pain management at this time. Pt came from the emergency department. Pt has a red puffy spot with an open area on her right side of her neck. It is tender to the touch and painful to turn her head. All safety precautions are in place. Bed in low position and locked. Call bell within reach.     Problem: Cellulitis Care Plan (Adult)  Goal: *Control of acute pain  Outcome: Progressing Towards Goal  Goal: *Skin integrity maintained  Outcome: Progressing Towards Goal  Goal: *Absence of infection signs and symptoms  Outcome: Progressing Towards Goal     Problem: Patient Education: Go to Patient Education Activity  Goal: Patient/Family Education  Outcome: Progressing Towards Goal     Problem: Pain  Goal: *Control of Pain  Outcome: Progressing Towards Goal  Goal: *PALLIATIVE CARE:  Alleviation of Pain  Outcome: Progressing Towards Goal     Problem: Patient Education: Go to Patient Education Activity  Goal: Patient/Family Education  Outcome: Progressing Towards Goal

## 2021-11-14 LAB
BACTERIA SPEC CULT: NORMAL
SERVICE CMNT-IMP: NORMAL